# Patient Record
Sex: FEMALE | Race: WHITE | URBAN - METROPOLITAN AREA
[De-identification: names, ages, dates, MRNs, and addresses within clinical notes are randomized per-mention and may not be internally consistent; named-entity substitution may affect disease eponyms.]

---

## 2020-07-07 ENCOUNTER — OUTPATIENT (OUTPATIENT)
Dept: OUTPATIENT SERVICES | Facility: HOSPITAL | Age: 70
LOS: 1 days | Discharge: HOME | End: 2020-07-07

## 2020-07-07 DIAGNOSIS — Z11.59 ENCOUNTER FOR SCREENING FOR OTHER VIRAL DISEASES: ICD-10-CM

## 2020-07-10 ENCOUNTER — OUTPATIENT (OUTPATIENT)
Dept: OUTPATIENT SERVICES | Facility: HOSPITAL | Age: 70
LOS: 1 days | Discharge: HOME | End: 2020-07-10

## 2020-07-10 VITALS
HEIGHT: 67 IN | HEART RATE: 62 BPM | RESPIRATION RATE: 17 BRPM | OXYGEN SATURATION: 98 % | DIASTOLIC BLOOD PRESSURE: 77 MMHG | WEIGHT: 167.99 LBS | SYSTOLIC BLOOD PRESSURE: 165 MMHG | TEMPERATURE: 97 F

## 2020-07-10 VITALS — SYSTOLIC BLOOD PRESSURE: 152 MMHG | DIASTOLIC BLOOD PRESSURE: 80 MMHG | HEART RATE: 58 BPM | RESPIRATION RATE: 15 BRPM

## 2020-07-10 DIAGNOSIS — S72.90XA UNSPECIFIED FRACTURE OF UNSPECIFIED FEMUR, INITIAL ENCOUNTER FOR CLOSED FRACTURE: Chronic | ICD-10-CM

## 2020-07-10 NOTE — PRE-ANESTHESIA EVALUATION ADULT - NSANTHOSAYNRD_GEN_A_CORE
No. BURAK screening performed.  STOP BANG Legend: 0-2 = LOW Risk; 3-4 = INTERMEDIATE Risk; 5-8 = HIGH Risk

## 2020-07-16 DIAGNOSIS — H25.89 OTHER AGE-RELATED CATARACT: ICD-10-CM

## 2020-07-16 DIAGNOSIS — F41.9 ANXIETY DISORDER, UNSPECIFIED: ICD-10-CM

## 2021-01-27 PROBLEM — F41.9 ANXIETY DISORDER, UNSPECIFIED: Chronic | Status: ACTIVE | Noted: 2020-07-10

## 2021-01-27 PROBLEM — H26.9 UNSPECIFIED CATARACT: Chronic | Status: ACTIVE | Noted: 2020-07-10

## 2021-02-16 ENCOUNTER — OUTPATIENT (OUTPATIENT)
Dept: OUTPATIENT SERVICES | Facility: HOSPITAL | Age: 71
LOS: 1 days | Discharge: HOME | End: 2021-02-16

## 2021-02-16 DIAGNOSIS — Z11.59 ENCOUNTER FOR SCREENING FOR OTHER VIRAL DISEASES: ICD-10-CM

## 2021-02-16 DIAGNOSIS — S72.90XA UNSPECIFIED FRACTURE OF UNSPECIFIED FEMUR, INITIAL ENCOUNTER FOR CLOSED FRACTURE: Chronic | ICD-10-CM

## 2021-02-19 ENCOUNTER — OUTPATIENT (OUTPATIENT)
Dept: OUTPATIENT SERVICES | Facility: HOSPITAL | Age: 71
LOS: 1 days | Discharge: HOME | End: 2021-02-19

## 2021-02-19 VITALS
HEIGHT: 67 IN | SYSTOLIC BLOOD PRESSURE: 119 MMHG | DIASTOLIC BLOOD PRESSURE: 77 MMHG | RESPIRATION RATE: 18 BRPM | OXYGEN SATURATION: 98 % | HEART RATE: 68 BPM | WEIGHT: 177.91 LBS | TEMPERATURE: 97 F

## 2021-02-19 VITALS — RESPIRATION RATE: 17 BRPM | HEART RATE: 70 BPM | DIASTOLIC BLOOD PRESSURE: 70 MMHG | SYSTOLIC BLOOD PRESSURE: 138 MMHG

## 2021-02-19 DIAGNOSIS — S72.90XA UNSPECIFIED FRACTURE OF UNSPECIFIED FEMUR, INITIAL ENCOUNTER FOR CLOSED FRACTURE: Chronic | ICD-10-CM

## 2021-02-19 DIAGNOSIS — Z98.890 OTHER SPECIFIED POSTPROCEDURAL STATES: Chronic | ICD-10-CM

## 2021-02-19 RX ORDER — LOSARTAN POTASSIUM 100 MG/1
1 TABLET, FILM COATED ORAL
Qty: 0 | Refills: 0 | DISCHARGE

## 2021-02-19 RX ORDER — ESCITALOPRAM OXALATE 10 MG/1
1 TABLET, FILM COATED ORAL
Qty: 0 | Refills: 0 | DISCHARGE

## 2021-02-23 DIAGNOSIS — F41.9 ANXIETY DISORDER, UNSPECIFIED: ICD-10-CM

## 2021-02-23 DIAGNOSIS — Z80.3 FAMILY HISTORY OF MALIGNANT NEOPLASM OF BREAST: ICD-10-CM

## 2021-02-23 DIAGNOSIS — Z92.3 PERSONAL HISTORY OF IRRADIATION: ICD-10-CM

## 2021-02-23 DIAGNOSIS — I10 ESSENTIAL (PRIMARY) HYPERTENSION: ICD-10-CM

## 2021-02-23 DIAGNOSIS — H25.11 AGE-RELATED NUCLEAR CATARACT, RIGHT EYE: ICD-10-CM

## 2021-02-23 DIAGNOSIS — Z85.3 PERSONAL HISTORY OF MALIGNANT NEOPLASM OF BREAST: ICD-10-CM

## 2023-11-14 PROBLEM — Z87.898 PERSONAL HISTORY OF OTHER SPECIFIED CONDITIONS: Chronic | Status: ACTIVE | Noted: 2021-02-19

## 2023-12-11 ENCOUNTER — TRANSCRIPTION ENCOUNTER (OUTPATIENT)
Age: 73
End: 2023-12-11

## 2025-02-12 NOTE — ASU PREOP CHECKLIST - BP NONINVASIVE SYSTOLIC (MM HG)
Occupational Therapy   Evaluation/Discharge    Name: Katey Cerrato  MRN: [id number]  Admitting Diagnosis: Biliary obstruction  Recent Surgery: * No surgery found *      Recommendations:     Discharge Recommendations: No Therapy Indicated  Discharge Equipment Recommendations:   (tub transfer bench)  Barriers to discharge:  None    Assessment:     Katey Cerrato is a 70 y.o. female with a medical diagnosis of Biliary obstruction. Pt is independent with ADLs & functional mobility/transfers & will not require further acute OT services at this time.    Plan:     Discharge from OT.    Subjective     Chief Complaint: abdominal discomfort but able to manage  Patient/Family Comments/goals: attend her adopted son's graduation in [place]     Occupational Profile:  Living Environment: pt lives alone in a [facility] with 7 [address]RAD[address] with L HR. Bathroom: tub/shower combo  Previous level of function: Independent  Roles and Routines: pt is retired, still drives, & is active in her Muslim  Equipment Used at Home: cane, straight, bedside commode  Assistance upon Discharge: Family    Pain/Comfort:  Pain Rating 1: other (see comments) (did not rate)  Location 1: abdomen  Pain Addressed 1: Distraction    Patients cultural, spiritual, Yazidism conflicts given the current situation: no    Objective:     Communicated with: nurse prior to session.  Patient found up in chair with  (no active lines) upon OT entry to room.    General Precautions: Standard, fall  Orthopedic Precautions: N/A  Braces: N/A  Respiratory Status: Room air    Occupational Performance:    Functional Mobility/Transfers:  Patient completed 2 Sit <> Stand Transfers with independence  with  no assistive device   1st STS from chair  2nd STS from toilet    Patient completed Toilet Transfer Step Transfer technique with independence with  no AD  Pt independently managed gown and sequenced all steps with good safety awareness    Functional Mobility: pt ambulated in room to simulate  household environment to assess/improve overall strength, coordination, activity tolerance & safety awareness required for participation/independence wit MRADLs/IADLs  No LOB/SOB noted  Pt demonstrated good safety awareness    Activities of Daily Living:  Lower Body Dressing: independence to don/dof sock  Pt demonstrated ability to complete figure 4 technique to complete task    Cognitive/Visual Perceptual:  Cognitive/Psychosocial Skills:     -       Follows Commands/attention:Follows multistep  commands    Physical Exam:  Dominant hand: -       Right  Upper Extremity Range of Motion:     -       Right Upper Extremity: WFL  -       Left Upper Extremity: WFL  Upper Extremity Strength:    -       Right Upper Extremity: WFL  -       Left Upper Extremity: WFL   Strength:    -       Right Upper Extremity: WFL  -       Left Upper Extremity: WFL  Fine Motor Coordination:    -       Intact  Left hand thumb/finger opposition skills and Right hand thumb/finger opposition skills  Gross motor coordination:   WFL    AMPAC 6 Click ADL:  AMPAC Total Score: 24    Treatment & Education:  -Education on importance of OOB activity to maintain/improve overall activity tolerance and promote recovery  -Pt educated to call for assistance and to transfer with hospital staff only  -Provided education regarding role of OT & POC with pt verbalizing understanding. Pt had no further questions & when asked whether there were any concerns pt reported none.     Patient left up in chair with call button in reach    GOALS:   Multidisciplinary Problems       Occupational Therapy Goals       Not on file              Multidisciplinary Problems (Resolved)          Problem: Occupational Therapy    Goal Priority Disciplines Outcome Interventions   Occupational Therapy Goal   (Resolved)     OT, PT/OT Met    Description:                          History:     Past Medical History:   Diagnosis Date    Cataract     Empty sella syndrome     GHD (growth  hormone deficiency)     Glaucoma     Graves' disease with exophthalmos     History of vitamin D deficiency     Hyperlipidemia     Hypertension     Seizures     Thyroid nodule     Thyroid nodule     Type II or unspecified type diabetes mellitus without mention of complication, uncontrolled          Past Surgical History:   Procedure Laterality Date    BREAST BIOPSY      CATARACT EXTRACTION W/  INTRAOCULAR LENS IMPLANT Right 3/15/2021    Procedure: EXTRACTION, CATARACT, WITH IOL INSERTION;  Surgeon: Cj Caban MD;  Location: [facility] OR;  Service: Ophthalmology;  Laterality: Right;    CATARACT EXTRACTION W/  INTRAOCULAR LENS IMPLANT Left 3/29/2021    Procedure: EXTRACTION, CATARACT, WITH IOL INSERTION;  Surgeon: Cj Caban MD;  Location: [facility] OR;  Service: Ophthalmology;  Laterality: Left;    CHOLECYSTECTOMY      HYSTERECTOMY      one ovary intact    INJECTION OF JOINT Left 2/6/2024    Procedure: INJECTION, JOINT LEFT KNEE WITH STEROID;  Surgeon: She Schwartz MD;  Location: [facility] PAIN [facility]GT;  Service: Pain Management;  Laterality: Left;  [phone]    KNEE SURGERY      LYMPH NODE BIOPSY  3010    OOPHORECTOMY         Time Tracking:     OT Date of Treatment: 02/12/25  OT Start Time: 1050  OT Stop Time: 1104  OT Total Time (min): 14 min    Billable Minutes:Evaluation 14    2/12/2025   165